# Patient Record
Sex: MALE | Race: WHITE
[De-identification: names, ages, dates, MRNs, and addresses within clinical notes are randomized per-mention and may not be internally consistent; named-entity substitution may affect disease eponyms.]

---

## 2018-04-27 ENCOUNTER — HOSPITAL ENCOUNTER (EMERGENCY)
Dept: HOSPITAL 97 - ER | Age: 33
Discharge: ANOTHER HOSPITAL | End: 2018-04-27
Payer: COMMERCIAL

## 2018-04-27 DIAGNOSIS — R55: Primary | ICD-10-CM

## 2018-04-27 DIAGNOSIS — R42: ICD-10-CM

## 2018-04-27 LAB
ALBUMIN SERPL BCP-MCNC: 4.5 G/DL (ref 3.2–5.5)
ALP SERPL-CCNC: 81 IU/L (ref 42–121)
ALT SERPL W P-5'-P-CCNC: 69 IU/L (ref 10–60)
AST SERPL W P-5'-P-CCNC: 59 IU/L (ref 10–42)
BUN BLD-MCNC: 11 MG/DL (ref 6–20)
GLUCOSE SERPLBLD-MCNC: 89 MG/DL (ref 65–120)
HCT VFR BLD CALC: 44.3 % (ref 39.6–49)
INR BLD: 1.05
LYMPHOCYTES # SPEC AUTO: 2 K/UL (ref 0.7–4.9)
MAGNESIUM SERPL-MCNC: 2 MG/DL (ref 1.8–2.5)
MCH RBC QN AUTO: 29.3 PG (ref 27–35)
MCV RBC: 86.1 FL (ref 80–100)
PMV BLD: 8.8 FL (ref 7.6–11.3)
POTASSIUM SERPL-SCNC: 3.9 MEQ/L (ref 3.6–5)
RBC # BLD: 5.15 M/UL (ref 4.33–5.43)

## 2018-04-27 PROCEDURE — 85025 COMPLETE CBC W/AUTO DIFF WBC: CPT

## 2018-04-27 PROCEDURE — 36415 COLL VENOUS BLD VENIPUNCTURE: CPT

## 2018-04-27 PROCEDURE — 70553 MRI BRAIN STEM W/O & W/DYE: CPT

## 2018-04-27 PROCEDURE — 70450 CT HEAD/BRAIN W/O DYE: CPT

## 2018-04-27 PROCEDURE — 71045 X-RAY EXAM CHEST 1 VIEW: CPT

## 2018-04-27 PROCEDURE — 85610 PROTHROMBIN TIME: CPT

## 2018-04-27 PROCEDURE — 83735 ASSAY OF MAGNESIUM: CPT

## 2018-04-27 PROCEDURE — 81003 URINALYSIS AUTO W/O SCOPE: CPT

## 2018-04-27 PROCEDURE — 80076 HEPATIC FUNCTION PANEL: CPT

## 2018-04-27 PROCEDURE — 99285 EMERGENCY DEPT VISIT HI MDM: CPT

## 2018-04-27 PROCEDURE — 84484 ASSAY OF TROPONIN QUANT: CPT

## 2018-04-27 PROCEDURE — 80048 BASIC METABOLIC PNL TOTAL CA: CPT

## 2018-04-27 PROCEDURE — 93005 ELECTROCARDIOGRAM TRACING: CPT

## 2018-04-27 PROCEDURE — 83880 ASSAY OF NATRIURETIC PEPTIDE: CPT

## 2018-04-27 NOTE — RAD REPORT
EXAM DESCRIPTION:  MRI - Brain W/Wo Cont - 4/27/2018 1:54 pm

 

CLINICAL HISTORY:  Syncope, altered mental status, abnormal CT study

 

COMPARISON:  CT head April 27

 

TECHNIQUE:  Sagittal and axial T1-weighted images were obtained. Axial PD/heavily T2-weighted and T2-
FLAIR images were obtained along with axial DWI/ADC mapping sequences.  Coronal heavily T2 weighted s
equence obtained.  Axial and coronal post-contrast T1-weighted images were also obtained. A 20 ml Mul
tiHance contrast following utilized.

 

FINDINGS:  Approximately 6 centimeter area of hypointense T1 and hyperintense T2/IR signal is present
 filling most of the right temporal lobe. There is localized mass effect. No ventricular entrapment s
een. Signal is predominantly hypointense on diffusion-weighted imaging and predominantly hyperintense
 on the ADC mapping sequence. Postcontrast imaging demonstrates multiple clustered ring-enhancing les
ions within this broader area of signal abnormality. Lesions range from 3 mm to 17 mm in diameter.

 

Elsewhere in the brain parenchyma there is no other area of signal abnormality or enhancement. No arpan
ft across the midline. No other areas of signal abnormality identified. Signal voids are seen as a no
rmal finding in the major intracranial vessels.

 

No globe or orbital content abnormality. Mastoid air cells are clear. Significant mucosal thickening 
changes are present in the maxillary sinuses.

Mastoid air cells and paranasal sinuses are clear.

 

Findings discussed with the referring clinician 1405 hours.

 

IMPRESSION:  Multiple clustered ring-enhancing lesions 3-17 mm in size contained within a 6 centimete
r area of edema filling the right temporal lobe.

 

GBM or other primary brain malignancy is the most likely etiology. MS or infection etiologies are unl
ikely.

 

No other brain parenchymal abnormalities outside of the right temporal lobe.

 

Prominent paranasal sinus mucosal thickening. No air-fluid level.

## 2018-04-27 NOTE — EDPHYS
Physician Documentation                                                                           

 [facility]                                                                

Name: Kali Handy                                                                               

Age: 33 yrs                                                                                       

Sex: Male                                                                                         

: 1985                                                                                   

MRN: [id number]                                                                                   

Arrival Date: 2018                                                                          

Time: 11:05                                                                                       

Account#: [id number]                                                                            

Bed 19                                                                                            

Private MD: Jasen Cam H                                                                    

ED Physician Mani Liu                                                                       

HPI:                                                                                              

                                                                                             

13:48 This 33 yrs old  Male presents to ER via Wheelchair with complaints of Passed  jr8 

      Out Prior To Arrival.                                                                       

13:48 The patient has experienced syncope. Onset: The symptoms/episode began/occurred         jr8 

      acutely, today. Duration: This was a single episode. Context: the episode(s) was            

      witnessed, by family. Associated injury: Head/face: contusion. Associated signs and         

      symptoms: Pertinent negatives: agitation, blurred vision, chest pain, confusion,            

      diaphoresis, dizziness, headache, nausea, palpitations, shortness of breath, vertigo,       

      vomiting, weakness. Current symptoms: Currently, the patient is not experiencing any        

      symptoms, the patient feels back to baseline, no decreased level of consciousness, no       

      confusion, no dysphasia, no headache, no paralysis, no visual changes. The patient has      

      not experienced similar symptoms in the past. The patient has been recently seen by a       

      physician:. Patient stated for the past couple of weeks has noticed periodic pardeep vu        

      like sensations and odd dissociative memories. Had near syncopal episode in Dr. Villalba office today. Syncopal episode prior to that. Dr. Villalba referred patient to ED     

      for further work up and evaluation of syncope and other neurological symptoms .             

                                                                                                  

Historical:                                                                                       

- Allergies:                                                                                      

11:31 NKA;                                                                                    iw  

- Home Meds:                                                                                      

12:23 None [Active];                                                                          iw  

- PMHx:                                                                                           

12:23 None;                                                                                   iw  

- PSHx:                                                                                           

11:31 None;                                                                                   iw  

                                                                                                  

- Immunization history:: Adult Immunizations up to date.                                          

- Social history:: Smoking status: Patient/guardian denies using tobacco.                         

                                                                                                  

                                                                                                  

ROS:                                                                                              

13:48 Eyes: Negative for injury, pain, redness, and discharge, ENT: Negative for injury,      jr8 

      pain, and discharge, Neck: Negative for injury, pain, and swelling, Cardiovascular:         

      Negative for chest pain, palpitations, and edema, Respiratory: Negative for shortness       

      of breath, cough, wheezing, and pleuritic chest pain, Abdomen/GI: Negative for              

      abdominal pain, nausea, vomiting, diarrhea, and constipation, Back: Negative for injury     

      and pain, MS/Extremity: Negative for injury and deformity, Skin: Negative for injury,       

      rash, and discoloration.                                                                    

13:48 Neuro: Positive for dizziness, headache.                                                    

                                                                                                  

Exam:                                                                                             

13:48 Eyes:  Pupils equal round and reactive to light, extra-ocular motions intact.  Lids and jr8 

      lashes normal.  Conjunctiva and sclera are non-icteric and not injected.  Cornea within     

      normal limits.  Periorbital areas with no swelling, redness, or edema. ENT:  Nares          

      patent. No nasal discharge, no septal abnormalities noted.  Tympanic membranes are          

      normal and external auditory canals are clear.  Oropharynx with no redness, swelling,       

      or masses, exudates, or evidence of obstruction, uvula midline.  Mucous membranes           

      moist. Neck:  Trachea midline, no thyromegaly or masses palpated, and no cervical           

      lymphadenopathy.  Supple, full range of motion without nuchal rigidity, or vertebral        

      point tenderness.  No Meningismus. Cardiovascular:  Regular rate and rhythm with a          

      normal S1 and S2.  No gallops, murmurs, or rubs.  Normal PMI, no JVD.  No pulse             

      deficits. Respiratory:  Lungs have equal breath sounds bilaterally, clear to                

      auscultation and percussion.  No rales, rhonchi or wheezes noted.  No increased work of     

      breathing, no retractions or nasal flaring. Abdomen/GI:  Soft, non-tender, with normal      

      bowel sounds.  No distension or tympany.  No guarding or rebound.  No evidence of           

      tenderness throughout. Back:  No spinal tenderness.  No costovertebral tenderness.          

      Full range of motion. Skin:  Warm, dry with normal turgor.  Normal color with no            

      rashes, no lesions, and no evidence of cellulitis. MS/ Extremity:  Pulses equal, no         

      cyanosis.  Neurovascular intact.  Full, normal range of motion. Neuro:  Awake and           

      alert, GCS 15, oriented to person, place, time, and situation.  Cranial nerves II-XII       

      grossly intact.  Motor strength 5/5 in all extremities.  Sensory grossly intact.            

      Cerebellar exam normal.  Normal gait.                                                       

                                                                                                  

Vital Signs:                                                                                      

11:31  / 80; Pulse 78; Resp 16 S; Temp 97.5(TE); Pulse Ox 99% on R/A; Pain 0/10;        iw  

12:53  / 88 Supine; Pulse 75; Resp 20; Pulse Ox 99% on R/A; Pain 0/10;                  em  

12:53  / 86 Sitting; Pulse 79; Resp 18; Pulse Ox 98% on R/A;                            em  

12:53  / 82 Standing; Pulse 88; Resp 18;                                                em  

13:58  / 80; Pulse 81; Resp 17; Pulse Ox 98% on R/A;                                    em  

15:15  / 83; Pulse 83; Resp 18; Temp 98.1; Pulse Ox 100% on R/A; Pain 0/10;             em  

                                                                                                  

MDM:                                                                                              

11:33 Patient medically screened.                                                             [facility] 

15:09 Data reviewed: vital signs, nurses notes, lab test result(s), EKG, radiologic studies,  [facility] 

      CT scan, MRI, plain films. Data interpreted: Pulse oximetry: on room air is 98 %.           

      Interpretation: normal. Counseling: I had a detailed discussion with the patient and/or     

      guardian regarding: the historical points, exam findings, and any diagnostic results        

      supporting the discharge/admit diagnosis, lab results, radiology results, the need to       

      transfer to another facility, for higher level of care, [facility]        

      does not immediately have the required specialist. ED course: [facility] contacted       

      and will accept patient for neurosurgical evaluation .                                      

                                                                                                  

                                                                                             

12:06 Order name: Basic Metabolic Panel; Complete Time: 13:11                                                                                                                              

12:06 Order name: BNP; Complete Time: 13:                                                                                             

12:06 Order name: CBC with Diff; Complete Time: 13:34                                         8 

                                                                                             

12:06 Order name: LFT's; Complete Time: 13:11                                                                                                                                              

12:06 Order name: Magnesium; Complete Time: 13:11                                                                                                                                          

12:06 Order name: PT-INR; Complete Time: 13:11                                                                                                                                             

12:06 Order name: XRAY Chest (1 view); Complete Time: 13:11                                                                                                                                

12:06 Order name: EKG; Complete Time: 12:07                                                                                                                                                

12:06 Order name: Troponin (emerg Dept Use Only); Complete Time: 13:11                        8 

                                                                                             

12:06 Order name: CT Head Brain wo Cont; Complete Time: 12:47                                 jr8 

                                                                                             

12:47 Order name: Brain W/Wo Cont; Complete Time: 14:28                                       EDMS

                                                                                             

15:04 Order name: Urine Dipstick--Ancillary (enter results); Complete Time: 15:22             sg  

                                                                                             

12:06 Order name: Cardiac monitoring; Complete Time: 12:43                                    jr8 

                                                                                             

12:06 Order name: EKG - Nurse/Tech; Complete Time: 12:42                                      jr8 

                                                                                             

12:06 Order name: IV Saline Lock; Complete Time: 12:43                                        8 

                                                                                             

12:06 Order name: Labs collected and sent; Complete Time: 12:45                               8 

                                                                                             

12:06 Order name: O2 Per Protocol; Complete Time: 13:13                                       jr8 

                                                                                             

12:06 Order name: O2 Sat Monitoring; Complete Time: 12:44                                     8 

                                                                                             

12:06 Order name: Urine Dipstick-Ancillary (obtain specimen); Complete Time: 13:13            8 

                                                                                             

12:06 Order name: Orthostatics; Complete Time: 13:13                                          jr8 

                                                                                                  

Administered Medications:                                                                         

No medications were administered                                                                  

                                                                                                  

                                                                                                  

Disposition:                                                                                      

17:18 Co-signature as Attending Physician, Mani Liu MD I agree with the assessment and   kdr 

      plan of care.                                                                               

                                                                                                  

Disposition:                                                                                      

18 15:11 Transfer ordered to [facility]. Diagnosis is Primary           

  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                                                      

- Reason for transfer: Higher level of care.                                                      

- Accepting physician is Dr. Contreras .                                                             

- Condition is Stable.                                                                            

- Problem is new.                                                                                 

- Symptoms are unchanged.                                                                         

                                                                                                  

                                                                                                  

                                                                                                  

Signatures:                                                                                       

Dispatcher [employer]                           EDMS                                                 

Mani Liu MD MD   kdr                                                  

Mitch Holguin, LVN                       LVN  em                                                   

Karla Sotelo, LIANE                     RN   Mohan White PA                        PA   jr8                                                  

                                                                                                  

Corrections: (The following items were deleted from the chart)                                    

12:47 12:28 Brain With Cont ordered. EDMS                                                     EDMS

12:47 12:37 Brain With Cont+MRI.RAD.BRZ ordered. EDMS                                         EDMS

                                                                                                  

**************************************************************************************************

## 2018-04-27 NOTE — EKG
Test Date:    2018-04-27               Test Time:    12:27:31

Technician:   SUDHAKAR                                    

                                                     

MEASUREMENT RESULTS:                                       

Intervals:                                           

Rate:         69                                     

GA:           184                                    

QRSD:         90                                     

QT:           368                                    

QTc:          394                                    

Axis:                                                

P:            68                                     

GA:           184                                    

QRS:          83                                     

T:            77                                     

                                                     

INTERPRETIVE STATEMENTS:                                       

                                                     

Normal sinus rhythm

Normal ECG

No previous ECG available for comparison



Electronically Signed On 04-27-18 16:23:28 CDT by Marty Michel

## 2018-04-27 NOTE — RAD REPORT
EXAM DESCRIPTION:  CT - Head Brain Wo Cont - 4/27/2018 12:16 pm

 

CLINICAL HISTORY:  Syncope with dizziness and nausea

 

COMPARISON:  None.

 

TECHNIQUE:  Computed axial tomography of the head was obtained. IV contrast was not requested.

 

All CT scans are performed using dose optimization technique as appropriate and may include automated
 exposure control or mA/KV adjustment according to patient size.

 

FINDINGS:  An intracranial  bleed is not seen .

 

The ventricles are normal in caliber.

 

No extra-axial fluid collection is noted.

 

A 5 centimeter low-density area is present within the right temporal lobe. There is suggestion of a 1
5 millimeter lesion within the right temporal lobe.

 

Fluid within the sinuses/ mastoids is not seen.

 

IMPRESSION:  A 5 centimeter low-density area within the right temporal lobe may represent a combinati
on of mass and surrounding edema. Less likely this represents an acute infarction. It is recommended 
that the patient have an MRI with contrast for further evaluation.

 

The examination was discussed with Mohan Guzman in the Emergency Room at 12:20 p.m. April 27th 2

## 2018-04-27 NOTE — ER
Nurse's Notes                                                                                     

 [facility]                                                                

Name: Kali Handy                                                                               

Age: 33 yrs                                                                                       

Sex: Male                                                                                         

: 1985                                                                                   

MRN: [id number]                                                                                   

Arrival Date: 2018                                                                          

Time: 11:05                                                                                       

Account#: [id number]                                                                            

Bed 19                                                                                            

Private MD: Jasen Cam H                                                                    

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                                      

                                                                                                  

Presentation:                                                                                     

                                                                                             

11:22 Presenting complaint: Patient states: had a syncopal episode at work this morning, pt   iw  

      states he felt a wave of dizziness and nausea, was able to sit himself on a table but       

      then fell forward, hitting face on floor, was out for a few seconds, was seen by EMS,       

      VSS, BS=89, went to be seen at Dr. Phillips's office and had a near syncopal episode         

      while in office, pt now feeling tired and weak but dizziness is now gone, denies CP or      

      palpitations or SOB. Transition of care: patient was not received from another setting      

      of care. Onset of symptoms was 2018. Initial Sepsis Screen: Does the patient      

      meet any 2 criteria? No. Patient's initial sepsis screen is negative. Does the patient      

      have a suspected source of infection? No. Patient's initial sepsis screen is negative.      

      Care prior to arrival: None.                                                                

11:22 Method Of Arrival: Wheelchair                                                           iw  

11:22 Acuity: DEDRA 3                                                                           iw  

                                                                                                  

Historical:                                                                                       

- Allergies:                                                                                      

11:31 NKA;                                                                                    iw  

- Home Meds:                                                                                      

12:23 None [Active];                                                                          iw  

- PMHx:                                                                                           

12:23 None;                                                                                   iw  

- PSHx:                                                                                           

11:31 None;                                                                                   iw  

                                                                                                  

- Immunization history:: Adult Immunizations up to date.                                          

- Social history:: Smoking status: Patient/guardian denies using tobacco.                         

                                                                                                  

                                                                                                  

Screenin:00 Abuse screen: Denies threats or abuse. Nutritional screening: No deficits noted.        em  

      Tuberculosis screening: No symptoms or risk factors identified. Fall Risk None              

      identified.                                                                                 

                                                                                                  

Assessment:                                                                                       

11:30 General: Appears in no apparent distress. comfortable, Behavior is calm, cooperative.   em  

      General: Reports falling and hitting face this morning, reports having aura type            

      symptoms with "strong imagery and smells" before having a syncopal episode. Pain:           

      Denies pain. Neuro: Level of Consciousness is awake, alert, obeys commands, Oriented to     

      person, place, time, situation,  are equal bilaterally Gait is steady, Speech is       

      normal, Reports dizziness, upon standing a syncopal episode Denies. Cardiovascular:         

      Capillary refill < 3 seconds Patient's skin is warm and dry. Respiratory: Airway is         

      patent Respiratory effort is even, unlabored, Respiratory pattern is regular,               

      symmetrical, Breath sounds are clear bilaterally. GI: Abdomen is flat, Reports nausea,      

      vomiting. : Urine is clear. EENT: No signs and/or symptoms were reported regarding        

      the EENT system. Derm: Skin is intact, Skin is pink, warm \T\ dry. Wound noted nose Wound   

      is cover with band aid, no apparent bleeding noted. Musculoskeletal: Range of motion:       

      intact in all extremities.                                                                  

11:40 Reassessment: Patient appears in no apparent distress at this time. I agree with above  iw  

      assessment by Mitch Holguin LVN.                                                             

12:40 Reassessment: Patient appears in no apparent distress at this time. Patient and/or      em  

      family updated on plan of care and expected duration. Pain level reassessed. Patient is     

      alert, oriented x 3, equal unlabored respirations, skin warm/dry/pink. Patient states       

      symptoms have improved.                                                                     

13:00 Reassessment: Patient appears in no apparent distress at this time. pt wheeled to [facility]   em  

      via wheelchair by [employer] tech.                                                                 

14:08 Reassessment: Patient appears in no apparent distress at this time. Patient and/or      em  

      family updated on plan of care and expected duration. Pain level reassessed. returned       

      from MRI, placed back on the monitor.                                                       

14:27 Reassessment: Patient appears in no apparent distress at this time. JD Preston at bedside em  

      to discuss POC.                                                                             

15:02 Reassessment: Patient appears in no apparent distress at this time. Patient and/or      em  

      family updated on plan of care and expected duration. Pain level reassessed. Patient is     

      alert, oriented x 3, equal unlabored respirations, skin warm/dry/pink. Avinash Harrington at bedside.                                                                         

15:38 Reassessment: Patient appears in no apparent distress at this time. report called to    vicky Bass RN at [facility], awaiting EMS transport to facility.                       

15:50 Reassessment: Patient appears in no apparent distress at this time. report given to   em  

      EMS.                                                                                        

                                                                                                  

Vital Signs:                                                                                      

11:31  / 80; Pulse 78; Resp 16 S; Temp 97.5(TE); Pulse Ox 99% on R/A; Pain 0/10;        iw  

12:53  / 88 Supine; Pulse 75; Resp 20; Pulse Ox 99% on R/A; Pain 0/10;                  em  

12:53  / 86 Sitting; Pulse 79; Resp 18; Pulse Ox 98% on R/A;                            em  

12:53  / 82 Standing; Pulse 88; Resp 18;                                                em  

13:58  / 80; Pulse 81; Resp 17; Pulse Ox 98% on R/A;                                    em  

15:15  / 83; Pulse 83; Resp 18; Temp 98.1; Pulse Ox 100% on R/A; Pain 0/10;             em  

                                                                                                  

ED Course:                                                                                        

11:05 Patient arrived in ED.                                                                  rg4 

11:05 Jasen Cam DO is Private Physician.                                               rg4 

11:22 Saul Lopez PA is PHCP.                                                                cp  

11:22 Mani Liu MD is Attending Physician.                                              cp  

11:31 Triage completed.                                                                       iw  

11:31 Antipyretic given from triage as ordered by the ER provider.                            iw  

11:33 Mohan Guzman PA is PHCP.                                                               jr8 

11:33 Mani Liu MD is Attending Physician.                                              jr8 

12:08 Patient moved to CT.                                                                    jg1 

12:14 Mitch Holguin LVN is Primary Nurse.                                                     em  

12:16 CT completed. Patient tolerated procedure well. Patient moved back from CT.             vm2 

12:17 CT Head Brain wo Cont In Process Unspecified.                                           EDMS

12:34 EKG done, by EKG tech. reviewed by Mohan MILES.                                      vh  

12:35 X-ray completed. Portable x-ray completed in exam room. Patient tolerated procedure     ml  

      well.                                                                                       

12:37 XRAY Chest (1 view) In Process Unspecified.                                             EDMS

12:45 Inserted saline lock: 20 gauge in right antecubital area, using aseptic technique.      em  

      Blood collected.                                                                            

12:45 Initial lab(s) drawn, by me, sent to lab.                                               em  

13:02 Patient moved to MRI via wheelchair.                                                    em2 

13:12 Arm band placed on.                                                                     em  

13:28 Brain W/Wo Cont In Process Unspecified.                                                 EDMS

13:54 MRI completed. Patient tolerated well. Patient moved back from MRI.                     ka  

14:06 No provider procedures requiring assistance completed.                                  em  

14:18 Patient has correct armband on for positive identification. Bed in low position. Call   em  

      light in reach.                                                                             

14:58 initiated transfer with [facility] at 1434 with Mehnaz Ba Transfer coordination.      eb  

15:00 \T\1436 Thomas MILES speaking with Dr. Khanna for transfer.                                  eb  

15:01 \T\1453 administrative approval given by Mehnaz Ba from [facility]   eb  

      pt to go to room 2222.                                                                      

16:13 Patient transferred, IV remains in place.                                               em  

                                                                                                  

Administered Medications:                                                                         

No medications were administered                                                                  

                                                                                                  

                                                                                                  

Outcome:                                                                                          

15:11 ER care complete, transfer ordered by MD. scott 

16:12 Transferred by ground EMS to [facility] [place], Transfer form completed.    em  

      X-rays sent w/ patient.                                                                     

16:12 Condition: good                                                                             

16:12 Instructed on the need for transfer, Demonstrated understanding of instructions.            

16:14 Patient left the ED.                                                                    em  

                                                                                                  

Signatures:                                                                                       

Dispatcher [employer]                           Delaney Reinoso                              jMitch Muñoz, VIBHAN                       LVN  em                                                   

Karla Sotelo, Desiree Campa RN, Josh, PA PA   jr8                                                  

Ayaz Shannon                              em2                                                  

Mila Mccartney Corey, PA PA cp Aguilera, Katelyn ka Garcia, Rubi                                 4                                                  

Bernie Hebert                            2                                                  

Desiree Nava                                                   

                                                                                                  

Corrections: (The following items were deleted from the chart)                                    

13:11 13:05 General: Appears in no apparent distress. comfortable, Behavior is calm,          em  

      cooperative, em                                                                             

13:11 13:05 Pain: Denies pain. em                                                             em  

13:11 13:05 General: Reports falling and hitting face this morning, reports having aura type  em  

      symptoms and having a syncopal episode em                                                   

13:11 13:05 Neuro: Level of Consciousness is awake, alert, obeys commands, Oriented to        em  

      person, place, time, situation,  are equal bilaterally Gait is steady, Speech is       

      normal, Reports dizziness, upon standing em                                                 

13:11 13:05 Cardiovascular: Capillary refill < 3 seconds Patient's skin is warm and dry. em   em  

13:11 13:05 Respiratory: Airway is patent Respiratory effort is even, unlabored, Respiratory  em  

      pattern is regular, symmetrical, Breath sounds are clear bilaterally. em                    

13:11 13:05 GI: Abdomen is flat, em                                                           em  

13:11 13: : Urine is clear, em                                                            em  

 13: EENT: No signs and/or symptoms were reported regarding the EENT system. em        em  

 13: Derm: Skin is intact, Skin is pink, warm \T\ dry. em                                em

 13: Musculoskeletal: Range of motion: intact in all extremities, em                   em  

14: 11:30 General: Reports falling and hitting face this morning, reports having aura type  em  

      symptoms and having a syncopal episode em                                                   

14 11:30 Derm: Skin is intact, Skin is pink, warm \T\ dry. em                                em

15: 11:31  / 80; Pulse 78bpm; Resp 16bpm; Spontaneous; Pulse Ox 99% RA; Pain 0/10; iw iw  

                                                                                                  

**************************************************************************************************

## 2018-04-27 NOTE — RAD REPORT
EXAM DESCRIPTION:  Thomas Single View4/27/2018 12:42 pm

 

CLINICAL HISTORY:  Chest pain

 

COMPARISON:  none

 

FINDINGS:   The lungs appear clear of acute infiltrate. The heart is normal size

 

IMPRESSION:   No acute abnormalities displayed

## 2019-07-11 ENCOUNTER — HOSPITAL ENCOUNTER (EMERGENCY)
Dept: HOSPITAL 97 - ER | Age: 34
Discharge: LEFT BEFORE DISCHARGE | End: 2019-07-11
Payer: COMMERCIAL

## 2019-07-11 DIAGNOSIS — Z53.21: Primary | ICD-10-CM

## 2019-07-11 NOTE — XMS REPORT
Patient Summary Document

 Created on:2019



Patient:FELIX ELDER

Sex:Male

:1985

External Reference #:[id number]





Demographics







 Address  [address] [place] [address]

 

 Home Phone  [phone]

 

 Email Address  NONE

 

 Preferred Language  Unknown

 

 Marital Status  Unknown

 

 Rastafarian Affiliation  Unknown

 

 Race  Unknown

 

 Additional Race(s)  Unavailable

 

 Ethnic Group  Unknown









Author







 [facility]

 

 Address  [address] [place] [address] Dr. Flores [address] [place] [address]

 

 Phone  [phone]









Care Team Providers







 Name  Role  Phone

 

 DERICK PETESIDNEY  Unavailable  Unavailable









Problems

This patient has no known problems.



Allergies, Adverse Reactions, Alerts

This patient has no known allergies or adverse reactions.



Medications

This patient has no known medications.



Results







 Test Description  Test Time  Test Comments  Text Results  Atomic Results  
Result Comments









 MR, BRAIN, WITH  2019 17:50:00    FINAL REPORT PATIENT ID:  



       [id number] MR, BRAIN, WITH \T\  



       WITHOUT CONTRAST INDICATION:  



       Neoplasm: head, CNS, rx monitor  



       or f/u TECHNIQUE: Multiplanar,  



       multisequence MR imaging of the  



       brain was obtained before and  



       after uneventful administration  



       of gadolinium contrast.  



       COMPARISON: 19  



       FINDINGS:The area of increased  



       linear enhancement noted in the  



       most recent examination has  



       increased in size, now  



       measuring approximately 11 mm  



       in greatest transverse  



       dimension. Additionally, there  



       is nodular enhancement along  



       the medial/inferior portion of  



       the resection cavity (axial  



       image 10, annotated sagittal  



       image 22). Increased T2/FLAIR  



       signal is also present adjacent  



       to this lesion, greater in  



       volume than in the prior exam.  



       A peripheral focus of  



       enhancement abutting the skull  



       base measures 11 mm in greatest  



       transverse dimension. Multiple  



       additional subcentimeter  



       nodular foci are annotated on  



       sagittal series (images 19, 20,  



       21, and 23). No abnormal signal  



       characteristics or postcontrast  



       enhancement is identified in  



       the left hemisphere or within  



       the posterior fossa. The  



       resection cavity volume is  



       unchanged. Orbital contents are  



       unremarkable. Paranasal sinuses  



       are clear.  IMPRESSION:  



       Progression of intracranial  



       metastatic disease as evidenced  



       by increasing volume,  



       enhancement and surrounding  



       T2/FLAIR hyperintensity in the  



       previously noted peripheral  



       lesion in the resection cavity  



       as well as multiple distal  



       lesions in the right temporal  



       lobe. Signed: JR Sanchez Robert MDReport Verified  



       Date/Time:  2019 17:50:06  



       Reading Location: [facility]  



       Radiology Reading Room  



       Electronically signed by:  



       CATRACHITO SANCHEZ on  



       2019 05:50 PM  

 

 MR, BRAIN, WITH  2019 14:34:00    FINAL REPORT PATIENT ID:  



       [id number] MRI Brain with and  



       without contrast Clinical  



       History: Neoplasm: head, CNS,  



       recurrence, suspected/known  



       Technique: MRI of the brain  



       utilizing axial T1, T2, FLAIR,  



       GRE, DWI, sagittal T1; and  



       postgadolinium axial, sagittal,  



       and coronal T1-weighted images.  



       Comparisons: MRI 2019,  



       10/8/2018, 2018, 2018  



       Findings: Right sided  



       craniotomy changes are again  



       seen with an anterior temporal  



       cystic resection cavity. There  



       is a new 4 mm linear focus of  



       enhancement along the posterior  



       superior surgical margin  



       abutting the temporal lobe.  



       Adjacent marginal nonenhancing  



       FLAIR signal abnormality also  



       appear slightly increased.  



       Right insular FLAIR signal  



       abnormality is unchanged  



       without additional areas of  



       abnormal enhancement.  There is  



       no evidence of acute infarct or  



       hemorrhage. There is no  



       hydrocephalus or midline shift.  



       The craniocervical junction is  



       preserved. The major  



       intracranial flow-voids appear  



       patent.  IMPRESSION: Since  



       2019, new focal linear  



       enhancement along the posterior  



       superior temporal resection  



       margin with slightly increased  



       FLAIR signal abnormality. The  



       appearance is compatible with  



       progression of residual  



       neoplasm. Signed: Sree Daniel  



       MDReport Verified Date/Time:  



       2019 14:34:38 Reading  



       Location: [facility]  



       Reading Room  



       Electronically signed by:  



       SREE DANIEL M.D. on  



       2019 02:34 PM  

 

 MR, BRAIN, WITH  2019 10:22:00    FINAL REPORT PATIENT ID:  



       [id number] MR, BRAIN, WITH \T\  



       WITHOUT CONTRAST INDICATION:  



       Neoplasm: head, CNS, rx monitor  



       or f/u TECHNIQUE: Multiplanar,  



       multisequence MR imaging of the  



       brain was obtained before and  



       after uneventful administration  



       of gadolinium contrast.  



       COMPARISON: 2018  



       FINDINGS:Stable cystic changes  



       within the right middle cranial  



       fossa resection cavity.  



       Previously noted increased T2  



       signal along the posterior  



       margin of the resection cavity  



       and involving the right  



       external capsule are stable.  



       There is no associated  



       postcontrast enhancement.  



       Evaluation of the remaining  



       brain parenchyma in both  



       supratentorial and  



       infratentorial compartments  



       reveals no abnormal signal or  



       postcontrast enhancement.  



       Midline is normally positioned.  



       Vascular structures are  



       unremarkable. The  



       craniocervical junction is  



       normal. Ventricular  



       configuration is within normal  



       limits. There is no acute  



       osseous abnormality. Paranasal  



       sinuses reveal mild mucosal  



       polyposis and thickening in the  



       maxillary sinuses, greater on  



       the left.  IMPRESSION:  Stable  



       operative changes in the middle  



       cranial fossa without evidence  



       for progression or recurrence.  



       Signed: JR Sanchez Robert MDReport Verified  



       Date/Time:  2019 10:22:37  



       Reading Location: [facility]  



       Radiology Reading Room  



       Electronically signed by:  



       CATRACHITO SANCHEZ on  



       2019 10:22 AM  

 

 MR, BRAIN, WITH  2018-10-08 14:14:00  Location->[facility]  FINAL REPORT PATIENT ID:
  



     Daniel Carias  [id number]  MRI brain with and  



       without contrast Comparison:  



          None Reason for exam: 2018 Discussion:  



       Multiplanar MR imaging of the  



       brain was provided pre-and-post  



       IV gadolinium administration  



       using T1, T2, FLAIR, T2 star,  



       diffusion weighted sequences,  



       and ADC map imaging.  



       Right-sided craniotomy changes  



       are noted with right-sided  



       anterior temporal lobectomy  



       cavity. Nonenhancing hazy T2  



       hyperintense signal alterations  



       are seen involving the insula  



       and the residual medial  



       temporal lobe unchanged. No  



       concerning regional  



       enhancement. There are no  



       intracranial hematomas, other  



       masses, hydrocephalus, shift,  



       or extra-axial collections.  



       There are no areas of abnormal  



       enhancement or abnormal  



       diffusion restriction.  



       Flow-voids are seen in the  



       basilar and internal carotid  



       arteries as well as in the  



       large posterior dural sinuses.  



       The pineal, sella, and  



       craniocervical junction regions  



       are unremarkable. The  



       visualized orbital contents,  



       skullbase and surrounding soft  



       tissues are unremarkable.  



       Mucosal thickening is seen  



       within the maxillary sinuses.  



       Impressions:  Stable  



       postoperative appearance with  



       residual nonenhancing insula  



       and medial temporal signal  



       alterations. Signed: Benny Gambino Verified  



       Date/Time:  10/08/2018 14:14:37  



       Reading Location: [facility] B1 C013V  



       Neuro Reading Room  



       Electronically signed by: BENNY GAMBINO M.D. on 10/08/2018  



       02:14 PM  

 

 MR, BRAIN, WITH  2018 10:51:00  Location->[facility]  FINAL REPORT PATIENT ID:
  



     Daniel Carias  [id number]  MRI brain with and  



       without contrast Comparison:  



          May 23, 2018 Reason for  



       exam: GBM status post surgery  



       and chemoradiation Discussion:  



       Multiplanar MR imaging of the  



       brain was provided pre-and-post  



       IV gadolinium administration  



       using T1, T2, FLAIR, T2 star,  



       diffusion weighted sequences,  



       and ADC map imaging. There is a  



       right temporal lobe operative  



       cavity anteriorly with some  



       areas of hemosiderin stain  



       lining the posterior cavity  



       margin. There is no concerning  



       enhancement currently.  



       Ill-defined T2 hyperintense  



       signal alteration is seen  



       involving insula concerning for  



       nonenhancing subtle neoplasm.  



       Similar signal alteration is  



       seen in the residual right  



       medial temporal and hippocampal  



       region where additional  



       nonenhancing neoplasm could not  



       be excluded. No intracranial  



       hematoma, hydrocephalus,  



       midline shift, extra-axial  



       collection. There are no other  



       areas of abnormal enhancement  



       or abnormal diffusion  



       restriction.      Flow-voids  



       are seen in the basilar and  



       internal carotid arteries as  



       well as in the large posterior  



       dural sinuses. The pineal,  



       sella, and craniocervical  



       junction regions are  



       unremarkable. The visualized  



       orbital contents, paranasal  



       sinuses, skullbase and  



       surrounding soft tissues are  



       unremarkable.     Impressions:  



       Subtle nonenhancing signal  



       alterations near the right  



       temporal lobe operative cavity  



       as discussed concerning for  



       nonenhancing neoplasm. No  



       current concerning enhancement.  



       Signed: Benny Gambino  



       Verified Date/Time:  2018  



       10:51:55 Reading Location: [address] Neuro Reading Room  



        Electronically signed by: BENNY GAMBINO M.D. on 2018  



       10:51 AM  

 

 MR, BRAIN, WITH  2018 16:07:00    FINAL REPORT PATIENT ID:  



       [id number] MRI Brain with and  



       without contrast INDICATION:  



       Malignant neoplasm of brain.  



       TECHNIQUE: Multiplanar,  



       multisequence MR imaging of the  



       brain was performed, utilizing  



       the following imaging  



       sequences: Axial T1, T2, FLAIR,  



       DWI, GRE; sagittal T1;  



       postcontrast axial, sagittal,  



       and coronal T1. High-resolution  



       axial postcontrast T1-weighted  



       images were obtained per  



       [employer] protocol COMPARISON:  



       MRI brain 2018  



       FINDINGS:There is evolution of  



       right sided craniotomy changes  



       with anterior temporal lobe  



       operative cavity, partial  



       resorption of operative site  



       and extra axial hemorrhages,  



       and improvement in operative  



       cavity margin edema. New mild  



       operative cavity margin  



       enhancement suggests  



       granulation changes, though  



       minimal enhancing neoplasm  



       cannot be excluded. A new 5 mm  



       focus of nodular enhancement in  



       the right inferior basal  



       ganglia is suggestive of  



       neoplasm. Small diffusion  



       restricted foci on the  



       operative cavity margins in the  



       right mesial temporal lobe may  



       reflect residual ischemia  



       and/or hypercellular neoplasm.  



       There are nonenhancing signal  



       changes in the right temporal  



       lobe, hippocampus, insula, and  



       operculum are in keeping with  



       infiltrative neoplasm. Mass  



       effect is improved, with no  



       current midline shift. A 3-4 mm  



       extra axial collection is seen  



       beneath the craniotomy site.  



       Pneumocephalus has resolved.  



       There is no hydrocephalus or  



       acute infarct. The major [place]ski  



       of [place] flow voids are  



       maintained. The sella,  



       craniocervical junction, and  



       orbits are unremarkable. There  



       is chronic polypoid sinus  



       mucosal disease and moderate  



       volume right mastoid air cell  



       fluid. Scalp postoperative  



       changes and edema are improved.  



       IMPRESSION: Since 2018,  



       improved post operative changes  



       with operative cavity margin  



       granulation changes and new  



       small right inferior basal  



       ganglia enhancing focus  



       suggestive of neoplasm.  



       Nonenhancing signal changes in  



       keeping with infiltrative  



       neoplasm again noted. No  



       worrisome current mass effect.  



       Signed: Dariel Polanco  



       MDReport Verified Date/Time:  



       2018 16:07:37 Reading  



       Location: [address] Consult  



       Reading Room   Electronically  



       signed by: DARIEL POLANCO M.D. on 2018 04:07 PM  

 

 TISSUE EXAM  2018 11:54:00    Surgical Pathology Report  



                         Case:  



       [id number]  



                 Authorizing Provider:  



        Ben Andrea MD  



       Collected:           2018  



       1027            Ordering  



       Location:     [facility]  



       PERIOPERATIVE         Received:  



                  2018 1032  



         



       SERVICES  



         



                  Pathologist:  



         Jerome Sutton MD  



         



                    Specimens:   A) -  



       Tumor, right temporal tumor  



         



                                    B)  



       - Tumor, right temporal tumor  



         



                         Outside  



       molecular studies are performed  



       at [facility] for  



       assessment of MGMT gene  



       promoter methylation. It is NOT  



       DETECTED in this patient's  



       tumor.Please see attached  



       report.Addendum electronically  



       signed by Jerome Sutton MD  



       on 2018 at 11:54 AMA.  



       BRAIN TUMOR, RIGHT TEMPORAL  



       LOBE, CRANIOTOMY: -  



       GLIOBLASTOMA, IDH1 WILD-TYPE  



       (WHO GRADE IV)B. BRAIN TUMOR,  



       RIGHT TEMPORAL LOBE,  



       CRANIOTOMY: - GLIOBLASTOMA,  



       IDH1 WILD-TYPE (WHO GRADE IV)  



          Signing Pathologist Direct  



       Phone Line:  



       [phone]  



       signed by Jerome Sutton MD  



       on 5/3/2018 at  5:37  



       PMAdditional testing for MGMT  



       promoter methylation status  



       will be performed at an outside  



       laboratory ([facility]). An  



       addendum report will be issued  



       upon receipt of these  



       results.[id number]  



       [id number] of right temporal  



       lobeA. Right temporal tumor; B.  



       Mass of right temporal lobeA.  



       Received fresh for  



       intraoperative consultation  



       labeled with the patient's  



       information and "right temporal  



       tumor" is a 1 x 0.9 x 0.3 cm  



       aggregate of tan-white  



       hemorrhagic soft glistening  



       tissue. A squash prep is  



       performed. A portion of the  



       specimen is submitted for  



       frozen section diagnosis in  



       FSA1. The remainder of the  



       specimen is submitted in A1.  



       SH/plB. Received in formalin  



       labeled with the patient's  



       information, "right temporal  



       tumor" is a 6 x 5.6 x 2.5 cm  



       aggregate of tan-white soft  



       tissue with abundant necrosis  



       and hemorrhage. Sectioning  



       reveals a tan-white friable cut  



       surface within normal-appearing  



       brain parenchyma.  



       Representative sections of  



       tissue are submitted in  



       cassette B1 through B6.  



       SH/plRIGHT TEMPORAL LOBE TUMOR,  



       CRANIOTOMY:   - GLIOBLASTOMA  



       THESE FINDINGS ARE REPORTED TO  



       OR-11 BY DR. SUTTON ON MAY 1ST,  



       2018 AT 10:51 A.M.A-B. Sections  



       of tumor show normal brain  



       parenchyma infiltrated by  



       atypical large tumor cells with  



       high nuclear to cytoplasmic  



       ratios, nuclear hyperchromasia,  



       multinucleation, and irregular  



       borders. The chromatin is  



       vesicular with prominent  



       nucleoli. There is abundant  



       necrosis, with and without  



       pseudopalisading, and vascular  



       proliferation. The tumor cells  



       are positive for GFAP and p53.  



       The tumor cells are negative  



       for IDH-1. The following  



       special studies were performed  



       on this case and the  



       interpretation is incorporated  



       in the diagnostic report above:  



       Block A2- IDH1, GFAP, p53. The  



       immunohistochemistry test was  



       developed and its performance  



       characteristics determined by  



       [facility],  



       Pathology Laboratory. It has  



       not been cleared or approved by  



       the U.S. Food and Drug  



       Administration. The FDA has  



       determined that such clearance  



       or approval is not necessary.  



       The test is used for clinical  



       purposes. It should not be  



       regarded as investigational or  



       for research. This laboratory  



       is certified under the Clinical  



       Laboratory Improvement  



       Amendments of 1988 (CLIA-88) as  



       qualified to perform high  



       complexity clinical laboratory  



       testing.  









 TOXOPLASMA GONDII ANTIBODY, IGM  2018 15:04:00    









   Test Item  Value  Reference Range  Comments









 TOXOPLASMA IGM ANTIBODY (VESNA) (test code=742)  Negative    



POCT-GLUCOSE [id number] 09:02:00





 Test Item  Value  Reference Range  Comments

 

 POC-GLUCOSE METER (BEAKER)  87 mg/dL    TESTED AT [facility] [address]



 (test uyzy=6709)      [facility] [address]



BASIC METABOLIC [id number] 05:48:00





 Test Item  Value  Reference Range  Comments

 

 SODIUM (BEAKER) (test  136 meq/L  136-145  



 obum=385)      

 

 POTASSIUM (BEAKER) (test  4.7 meq/L  3.5-5.1  Specimen slightly



 code=379)      hemolyzed

 

 CHLORIDE (BEAKER) (test  108 meq/L    



 code=382)      

 

 CO2 (BEAKER) (test  17 meq/L  22-29  



 code=355)      

 

 BLOOD UREA NITROGEN  23 mg/dL  7-21  



 (BEAKER) (test code=354)      

 

 CREATININE (BEAKER) (test  0.66 mg/dL  0.57-1.25  Specimen slightly



 code=358)      hemolyzed

 

 GLUCOSE RANDOM (BEAKER)  114 mg/dL    



 (test code=652)      

 

 CALCIUM (BEAKER) (test  9.0 mg/dL  8.4-10.2  



 code=697)      

 

 EGFR (BEAKER) (test  137 mL/min/1.73 sq m    ESTIMATED GFR IS NOT AS



 code=1092)      ACCURATE AS CREATININE



       CLEARANCE IN PREDICTING



       GLOMERULAR FILTRATION



       RATE. ESTIMATED GFR IS



       NOT APPLICABLE FOR



       DIALYSIS PATIENTS.



CBC W/PLT COUNT &amp; AUTO [id number] 05:35:00





 Test Item  Value  Reference Range  Comments

 

 WHITE BLOOD CELL COUNT (BEAKER) (test code=775)  17.4 K/ L  3.5-10.5  

 

 RED BLOOD CELL COUNT (BEAKER) (test code=761)  4.01 M/ L  4.63-6.08  

 

 HEMOGLOBIN (BEAKER) (test code=410)  11.5 GM/DL  13.7-17.5  

 

 HEMATOCRIT (BEAKER) (test code=411)  34.8 %  40.1-51.0  

 

 MEAN CORPUSCULAR VOLUME (BEAKER) (test code=753)  86.8 fL  79.0-92.2  

 

 MEAN CORPUSCULAR HEMOGLOBIN (BEAKER) (test  28.7 pg  25.7-32.2  



 cjlz=241)      

 

 MEAN CORPUSCULAR HEMOGLOBIN CONC (BEAKER) (test  33.0 GM/DL  32.3-36.5  



 code=752)      

 

 RED CELL DISTRIBUTION WIDTH (BEAKER) (test  11.7 %  11.6-14.4  



 code=412)      

 

 PLATELET COUNT (BEAKER) (test code=756)  254 K/CU MM  150-450  

 

 MEAN PLATELET VOLUME (BEAKER) (test code=754)  10.2 fL  9.4-12.4  

 

 NUCLEATED RED BLOOD CELLS (BEAKER) (test  0 /100 WBC  0-0  



 code=413)      

 

 NEUTROPHILS RELATIVE PERCENT (BEAKER) (test  90 %    



 code=429)      

 

 LYMPHOCYTES RELATIVE PERCENT (BEAKER) (test  6 %    



 code=430)      

 

 MONOCYTES RELATIVE PERCENT (BEAKER) (test  3 %    



 code=431)      

 

 EOSINOPHILS RELATIVE PERCENT (BEAKER) (test  0 %    



 code=432)      

 

 BASOPHILS RELATIVE PERCENT (BEAKER) (test  0 %    



 code=437)      

 

 NEUTROPHILS ABSOLUTE COUNT (BEAKER) (test  15.65 K/ L  1.78-5.38  



 code=670)      

 

 LYMPHOCYTES ABSOLUTE COUNT (BEAKER) (test  1.06 K/ L  1.32-3.57  



 code=414)      

 

 MONOCYTES ABSOLUTE COUNT (BEAKER) (test  0.55 K/ L  0.30-0.82  



 code=415)      

 

 EOSINOPHILS ABSOLUTE COUNT (BEAKER) (test  0.01 K/ L  0.04-0.54  



 code=416)      

 

 BASOPHILS ABSOLUTE COUNT (BEAKER) (test  0.02 K/ L  0.01-0.08  



 code=417)      

 

 IMMATURE GRANULOCYTES-RELATIVE PERCENT (BEAKER)  1 %  0-1  



 (test code=2801)      



CBC W/PLT COUNT &amp; AUTO [id number] 14:23:00





 Test Item  Value  Reference Range  Comments

 

 WHITE BLOOD CELL COUNT (BEAKER) (test code=775)  22.9 K/ L  3.5-10.5  

 

 RED BLOOD CELL COUNT (BEAKER) (test code=761)  3.98 M/ L  4.63-6.08  

 

 HEMOGLOBIN (BEAKER) (test code=410)  11.5 GM/DL  13.7-17.5  

 

 HEMATOCRIT (BEAKER) (test code=411)  33.9 %  40.1-51.0  

 

 MEAN CORPUSCULAR VOLUME (BEAKER) (test code=753)  85.2 fL  79.0-92.2  

 

 MEAN CORPUSCULAR HEMOGLOBIN (BEAKER) (test  28.9 pg  25.7-32.2  



 byog=394)      

 

 MEAN CORPUSCULAR HEMOGLOBIN CONC (BEAKER) (test  33.9 GM/DL  32.3-36.5  



 code=752)      

 

 RED CELL DISTRIBUTION WIDTH (BEAKER) (test  11.6 %  11.6-14.4  



 code=412)      

 

 PLATELET COUNT (BEAKER) (test code=756)  304 K/CU MM  150-450  

 

 MEAN PLATELET VOLUME (BEAKER) (test code=754)  9.4 fL  9.4-12.4  

 

 NUCLEATED RED BLOOD CELLS (BEAKER) (test  0 /100 WBC  0-0  



 code=413)      



BASIC METABOLIC [id number] 10:58:00





 Test Item  Value  Reference Range  Comments

 

 SODIUM (BEAKER) (test  138 meq/L  136-145  



 tqpo=212)      

 

 POTASSIUM (BEAKER) (test  3.9 meq/L  3.5-5.1  



 code=379)      

 

 CHLORIDE (BEAKER) (test  106 meq/L    



 code=382)      

 

 CO2 (BEAKER) (test  23 meq/L  22-29  



 code=355)      

 

 BLOOD UREA NITROGEN  18 mg/dL  7-21  



 (BEAKER) (test code=354)      

 

 CREATININE (BEAKER) (test  0.68 mg/dL  0.57-1.25  



 code=358)      

 

 GLUCOSE RANDOM (BEAKER)  144 mg/dL    



 (test code=652)      

 

 CALCIUM (BEAKER) (test  9.1 mg/dL  8.4-10.2  



 code=697)      

 

 EGFR (BEAKER) (test  133 mL/min/1.73 sq m    ESTIMATED GFR IS NOT AS



 code=1092)      ACCURATE AS CREATININE



       CLEARANCE IN PREDICTING



       GLOMERULAR FILTRATION



       RATE. ESTIMATED GFR IS



       NOT APPLICABLE FOR



       DIALYSIS PATIENTS.



POCT-GLUCOSE [id number] 08:38:00





 Test Item  Value  Reference Range  Comments

 

 POC-GLUCOSE METER (BEAKER)  127 mg/dL    TESTED AT [address]



 (test nbgk=7727)      [facility] [facility] [address]



BLOOD [id number] 06:00:00





 Test Item  Value  Reference Range  Comments

 

 CULTURE (BEAKER) (test code=1095)  No growth in 5 days    



POCT-GLUCOSE [id number] 05:50:00





 Test Item  Value  Reference Range  Comments

 

 POC-GLUCOSE METER (BEAKER)  136 mg/dL    TESTED AT [address]



 (test yzll=7051)      [facility] [address]



POCT-GLUCOSE [id number] 00:09:00





 Test Item  Value  Reference Range  Comments

 

 POC-GLUCOSE METER (BEAKER)  161 mg/dL    TESTED AT [facility] [address]



 (test ajcm=2284)      [facility] [address]



POCT-GLUCOSE [id number] 17:05:00





 Test Item  Value  Reference Range  Comments

 

 POC-GLUCOSE METER (BEAKER)  160 mg/dL    TESTED AT [place] [id number] [facility]



 (test ggfh=5738)      [facility] [address]



MR, BRAIN, [id number] 15:32:00FINAL REPORT PATIENT ID:   [id number] MRI Brain 
with and without contrast INDICATION: CNS neoplasm, postop TECHNIQUE: 
Multiplanar, multisequence MR imaging of the brain was performed, utilizing the 
following imaging sequences: Axial T1, T2, FLAIR, DWI, GRE; sagittal T1; 
postcontrast axial, sagittal, andcoronal T1. COMPARISON: MRI brain 2018 
FINDINGS:Right sided craniotomy changes are noted with an anterior temporal 
lobe operative cavity with internal hemorrhage, including dependent blood. 
There is small volume hemorrhages as well as ischemic and vasogenic edema in 
the adjacent temporal lobe andhippocampus. Allowing for T1 hyperintense blood 
products, there is negligible residual enhancing neoplasm. Nonenhancing signal 
changes in the insula, subinsular and basal ganglia region, operculum, 
andresidual temporal lobe are in keeping with edema and infiltrative neoplasm. 
Right hemispheric dural enhancement is likely reactive and due to granulation 
changes. There is residual mass effect with 3 mm leftward midline shift but no 
hydrocephalus. There is scattered pneumocephalus. A minimal extra-axial 
collection is seen beneath the craniotomy site. There is an overlying subgaleal 
collection containing gas and fluid. There is multifocal polypoid sinus mucosal 
disease with minimal fluid secretions and a moderate right mastoid effusion. 
The sella, craniocervical junction, and orbits are unremarkable. IMPRESSION: 
Since 2018, status post craniotomy for mass resection with operative site 
hemorrhages and local ischemic edema. Allowing for blood, negligible if any 
residual enhancing neoplasm. Nonenhancing signal changes persist, in keeping 
with infiltrative tumor. Advise MRI follow up. Signed: Dariel Polancoeport Verified Date/Time:  2018 15:32:30 Reading Location: [facility] 
C013V Neuro Reading Room   Electronically signed by: DARIEL POLANCO M.D. 
on 2018 03:32 PMPOCT-GLUCOSE [id number] 11:36:00





 Test Item  Value  Reference Range  Comments

 

 POC-GLUCOSE METER (BEAKER)  157 mg/dL    TESTED AT [address]



 (test oiqr=2258)      [place] [address]



[id number] 11:12:00





 Test Item  Value  Reference Range  Comments

 

 POTASSIUM (BEAKER) (test code=379)  4.0 meq/L  3.5-5.1  



Check Serum Potassium level 2 hours after oral potassium replacement completed 
or 30 min after intravenous potassium replacement.[id number] 11:12:00





 Test Item  Value  Reference Range  Comments

 

 MAGNESIUM (BEAKER) (test code=627)  2.4 mg/dL  1.6-2.6  



Check Serum Potassium level 2 hours after oral potassium replacement completed 
or 30 min after intravenous potassium replacement.POCT-GLUCOSE [id number] 
07:40:00





 Test Item  Value  Reference Range  Comments

 

 POC-GLUCOSE METER (BEAKER)  135 mg/dL    TESTED AT [address]



 (test qhwk=2277)      [place] [address]



[id number] 04:59:00





 Test Item  Value  Reference Range  Comments

 

 PHOSPHORUS (BEAKER) (test code=604)  2.8 mg/dL  2.3-4.7  



[id number] 04:59:00





 Test Item  Value  Reference Range  Comments

 

 MAGNESIUM (BEAKER) (test code=627)  1.9 mg/dL  1.6-2.6  



BASIC METABOLIC [id number] 04:59:00





 Test Item  Value  Reference Range  Comments

 

 SODIUM (BEAKER) (test  140 meq/L  136-145  



 fhzw=939)      

 

 POTASSIUM (BEAKER) (test  3.8 meq/L  3.5-5.1  



 code=379)      

 

 CHLORIDE (BEAKER) (test  108 meq/L    



 code=382)      

 

 CO2 (BEAKER) (test  21 meq/L  22-29  



 code=355)      

 

 BLOOD UREA NITROGEN  11 mg/dL  7-21  



 (BEAKER) (test code=354)      

 

 CREATININE (BEAKER) (test  0.72 mg/dL  0.57-1.25  



 code=358)      

 

 GLUCOSE RANDOM (BEAKER)  158 mg/dL    



 (test code=652)      

 

 CALCIUM (BEAKER) (test  8.7 mg/dL  8.4-10.2  



 code=697)      

 

 EGFR (BEAKER) (test  124 mL/min/1.73 sq m    ESTIMATED GFR IS NOT AS



 code=1092)      ACCURATE AS CREATININE



       CLEARANCE IN PREDICTING



       GLOMERULAR FILTRATION



       RATE. ESTIMATED GFR IS



       NOT APPLICABLE FOR



       DIALYSIS PATIENTS.



CBC (HEMOGRAM ONLY)2018 04:42:00





 Test Item  Value  Reference Range  Comments

 

 WHITE BLOOD CELL COUNT (BEAKER) (test code=775)  18.9 K/ L  3.5-10.5  

 

 RED BLOOD CELL COUNT (BEAKER) (test code=761)  4.44 M/ L  4.63-6.08  

 

 HEMOGLOBIN (BEAKER) (test code=410)  12.8 GM/DL  13.7-17.5  

 

 HEMATOCRIT (BEAKER) (test code=411)  38.2 %  40.1-51.0  

 

 MEAN CORPUSCULAR VOLUME (BEAKER) (test code=753)  86.0 fL  79.0-92.2  

 

 MEAN CORPUSCULAR HEMOGLOBIN (BEAKER) (test  28.8 pg  25.7-32.2  



 saey=516)      

 

 MEAN CORPUSCULAR HEMOGLOBIN CONC (BEAKER) (test  33.5 GM/DL  32.3-36.5  



 code=752)      

 

 RED CELL DISTRIBUTION WIDTH (BEAKER) (test  11.4 %  11.6-14.4  



 code=412)      

 

 PLATELET COUNT (BEAKER) (test code=756)  302 K/CU MM  150-450  

 

 MEAN PLATELET VOLUME (BEAKER) (test code=754)  9.7 fL  9.4-12.4  

 

 NUCLEATED RED BLOOD CELLS (BEAKER) (test  0 /100 WBC  0-0  



 code=413)      



[id number] 19:00:00





 Test Item  Value  Reference Range  Comments

 

 MAGNESIUM (BEAKER) (test  2.7 mg/dL  1.6-2.6  Specimen markedly hemolyzed



 code=627)      



TOXOPLASMA GONDII ANTIBODY, [id number] 13:56:00





 Test Item  Value  Reference Range  Comments

 

 TOXOPLASMA GONDII IGG (BEAKER) (test ynwu=234)  Negative    



HEPATIC FUNCTION [id number] 06:05:00





 Test Item  Value  Reference Range  Comments

 

 TOTAL PROTEIN (BEAKER) (test code=770)  6.9 gm/dL  6.0-8.3  

 

 ALBUMIN (BEAKER) (test code=1145)  4.1 g/dL  3.5-5.0  

 

 BILIRUBIN TOTAL (BEAKER) (test code=377)  0.9 mg/dL  0.2-1.2  

 

 BILIRUBIN DIRECT (BEAKER) (test code=706)  0.3 mg/dL  0.1-0.5  

 

 ALKALINE PHOSPHATASE (BEAKER) (test code=346)  89 U/L    

 

 AST (SGOT) (BEAKER) (test code=353)  25 U/L  5-34  

 

 ALT (SGPT) (BEAKER) (test code=347)  49 U/L  6-55  



BASIC METABOLIC [id number] 06:05:00





 Test Item  Value  Reference Range  Comments

 

 SODIUM (BEAKER) (test  140 meq/L  136-145  



 qeaf=905)      

 

 POTASSIUM (BEAKER) (test  4.0 meq/L  3.5-5.1  



 code=379)      

 

 CHLORIDE (BEAKER) (test  106 meq/L    



 code=382)      

 

 CO2 (BEAKER) (test  25 meq/L  22-29  



 code=355)      

 

 BLOOD UREA NITROGEN  11 mg/dL  7-21  



 (BEAKER) (test code=354)      

 

 CREATININE (BEAKER) (test  0.79 mg/dL  0.57-1.25  



 code=358)      

 

 GLUCOSE RANDOM (BEAKER)  91 mg/dL    



 (test code=652)      

 

 CALCIUM (BEAKER) (test  9.3 mg/dL  8.4-10.2  



 code=697)      

 

 EGFR (BEAKER) (test  112 mL/min/1.73 sq m    ESTIMATED GFR IS NOT AS



 code=1092)      ACCURATE AS CREATININE



       CLEARANCE IN PREDICTING



       GLOMERULAR FILTRATION



       RATE. ESTIMATED GFR IS



       NOT APPLICABLE FOR



       DIALYSIS PATIENTS.



CT, CHEST, WITH [id number] 03:01:00FINAL REPORT PATIENT ID:   [id number] 
   EXAMINATION: CT examinations of the chest, abdomen and pelviswith oral and 
IV contrast. CLINICAL HISTORY: Multiple brain lesions. Evaluate for primary 
neoplasm, metastatic disease or associated infection. COMPARISON EXAM: Brain 
MRI 2018 TECHNIQUE: Followingthe administration of IV contrast, axial 
tomographic images were acquired through the chest, abdomenand pelvis. 
Postprocessing was performed and coronal and sagittal reformatted images were 
created and reviewed. The exam was performed according to our departmental dose 
optimization program which includes automated exposure control, adjustment of 
the mA and/or kV according to patient's size and/or use of iterative 
reconstructive technique. FINDINGS:  Chest:  The thoracic aorta is normal in 
caliber. No evidence of an aortic dissection. The central pulmonary arteries 
are unremarkable. Evaluation of the smaller peripheral pulmonary arteries is 
limited by the non-PE protocol technique.  The heart sizeis normal. No evidence 
of a pericardial effusion. The esophagus is decompressed. No evidence of 
pathologic axillary or mediastinal lymphadenopathy. The trachea and central 
airways are unremarkable. Curvilinear opacities are noted in the dependent 
portion of both lung bases compatible with atelectasis. No evidence of a 
discrete pulmonary nodule, pneumonia, pulmonary edema, pleural effusion, 
pneumothorax or pneumomediastinum. No evidence of an acute thoracic osseous 
abnormality or pathologic bone lesion. Abdomen and pelvis: The liver 
demonstrates homogeneous contrast enhancement. The gallbladder, bile ducts and 
pancreas are unremarkable. The spleen is mildly prominent measuring 13 cm. The 
right adrenal gland is unremarkable.  The left adrenal gland is associated with 
subtle nodularity measuring 1 cm. Although a component may reflect partial 
volume averaging artifact, a small indeterminate adrenalnodule should also be 
considered. No evidence of renal obstruction, nephrolithiasis or discrete renal 
mass. The ureters are decompressed. The bladder is unremarkable. The prostate 
gland is mildly prominent. The stomach is grossly unremarkable. The loops of 
small bowel are normal in caliber. There is mild wall thickening of the distal 
small bowel including the terminal ileum. A moderate to large volume of 
inspissated stool is noted throughout the colon. The colonic fecal burden 
limits evaluation for an underlying mucosal lesion. No evidence of high-grade 
mechanical bowel obstruction, pneumatosis or pneumoperitoneum. No definite 
evidence of pathologically enlarged lymph nodes. The abdominal aorta isnormal 
in caliber. Contrast also opacifies the portal venous system, mesenteric vessels
, renal vessels and the iliac arteries. Evaluation of the IVC and iliac veins 
is limited by timing of the contrastbolus. The testicles are incompletely 
visualized. The right testicle is associated with mild nodularsoft tissue 
fullness. No evidence of an acute osseous abnormality. A sclerotic 11 mm focus 
is noted in the left proximal femur. A small bone island is morphologically and 
statistically favored. Small sclerotic metastasis cannot be excluded. IMPRESSION
:  Left adrenal 1 cm indeterminate (benign versus malignant) nodule. Dedicated 
adrenal protocol MRI or noncontrast CT could be performed for more definitive 
tissue characterization. Moderate to large volume of inspissated stool 
throughout the colon suggesting dysmotility/constipation. The fecal burden 
limits evaluation for a colonic mucosal lesion.  Mild wall thickening of the 
distal small bowel including the terminal ileum. Incomplete distention versus 
an ileitis. The right testicle is incompletely visualized but associated with 
mild nodular soft tissue fullness. Recommend clinical correlation. Dedicated 
scrotal ultrasound could be performed for evaluation if warranted. Small 1 cm 
sclerotic focus involving the left proximal femur. Bone island favored. Small 
sclerotic metastasis cannot be excluded. Mildly prominent spleen (13 cm). Signed
: Lucas Navarro MDReport Verified Date/Time:  2018 03:01:30 Reading 
Location: [address] Reading Room      Electronically signed by: 
LUCAS NAVARRO M.D. on 2018 03:01 [facility] [id number] 03:01:00FINAL 
REPORT PATIENT ID:   [id number]    EXAMINATION: CT examinations of the chest, 
abdomen and pelviswith oral and IV contrast. CLINICAL HISTORY: Multiple brain 
lesions. Evaluate for primary neoplasm, metastatic disease or associated 
infection. COMPARISON EXAM: Brain MRI 2018 TECHNIQUE: Followingthe 
administration of IV contrast, axial tomographic images were acquired through 
the chest, abdomenand pelvis. Postprocessing was performed and coronal and 
sagittal reformatted images were created and reviewed. The exam was performed 
according to our departmental dose optimization program which includes 
automated exposure control, adjustment of the mA and/or kV according to patient'
s size and/or use of iterative reconstructive technique. FINDINGS:  Chest:  The 
thoracic aorta is normal in caliber. No evidence of an aortic dissection. The 
central pulmonary arteries are unremarkable. Evaluation of the smaller 
peripheral pulmonary arteries is limited by the non-PE protocol technique.  The 
heart sizeis normal. No evidence of a pericardial effusion. The esophagus is 
decompressed. No evidence of pathologic axillary or mediastinal 
lymphadenopathy. The trachea and central airways are unremarkable. Curvilinear 
opacities are noted in the dependent portion of both lung bases compatible with 
atelectasis. No evidence of a discrete pulmonary nodule, pneumonia, pulmonary 
edema, pleural effusion, pneumothorax or pneumomediastinum. No evidence of an 
acute thoracic osseous abnormality or pathologic bone lesion. Abdomen and pelvis
: The liver demonstrates homogeneous contrast enhancement. The gallbladder, 
bile ducts and pancreas are unremarkable. The spleen is mildly prominent 
measuring 13 cm. The right adrenal gland is unremarkable.  The left adrenal 
gland is associated with subtle nodularity measuring 1 cm. Although a component 
may reflect partial volume averaging artifact, a small indeterminate 
adrenalnodule should also be considered. No evidence of renal obstruction, 
nephrolithiasis or discrete renal mass. The ureters are decompressed. The 
bladder is unremarkable. The prostate gland is mildly prominent. The stomach is 
grossly unremarkable. The loops of small bowel are normal in caliber. There is 
mild wall thickening of the distal small bowel including the terminal ileum. A 
moderate to large volume of inspissated stool is noted throughout the colon. 
The colonic fecal burden limits evaluation for an underlying mucosal lesion. No 
evidence of high-grade mechanical bowel obstruction, pneumatosis or 
pneumoperitoneum. No definite evidence of pathologically enlarged lymph nodes. 
The abdominal aorta isnormal in caliber. Contrast also opacifies the portal 
venous system, mesenteric vessels, renal vessels and the iliac arteries. 
Evaluation of the IVC and iliac veins is limited by timing of the 
contrastbolus. The testicles are incompletely visualized. The right testicle is 
associated with mild nodularsoft tissue fullness. No evidence of an acute 
osseous abnormality. A sclerotic 11 mm focus is noted in the left proximal 
femur. A small bone island is morphologically and statistically favored. Small 
sclerotic metastasis cannot be excluded. IMPRESSION:  Left adrenal 1 cm 
indeterminate (benign versus malignant) nodule. Dedicated adrenal protocol MRI 
or noncontrast CT could be performed for more definitive tissue 
characterization. Moderate to large volume of inspissated stool throughout the 
colon suggesting dysmotility/constipation. The fecal burden limits evaluation 
for a colonic mucosal lesion.  Mild wall thickening of the distal small bowel 
including the terminal ileum. Incomplete distention versus an ileitis. The 
right testicle is incompletely visualized but associated with mild nodular soft 
tissue fullness. Recommend clinical correlation. Dedicated scrotal ultrasound 
could be performed for evaluation if warranted. Small 1 cm sclerotic focus 
involving the left proximal femur. Bone island favored. Small sclerotic 
metastasis cannot be excluded. Mildly prominent spleen (13 cm). Signed: Lucas Navarro MDReport Verified Date/Time:  2018 03:01:30 Reading Location: [address] Reading Room      Electronically signed by: LUCAS NAVARRO M.D. on 2018 03:01 AMMR, BRAIN, [id number] 10:44:00Stealth protocol 
for operative planningFINAL REPORT PATIENT ID:   [id number] MRI Brain with and 
without contrast Clinical History: brain massTechnique: MRI of the brain 
utilizing axial T1, T2, FLAIR, GRE, DWI, sagittal T1; and postgadoliniumaxial, 
sagittal, and coronal T1-weighted images. Stealth protocol treatment planning 
sequences are included. Comparisons: Outside MRI 2018 Findings: A cluster 
of irregularly enhancing nodules is again seen in the right anterior temporal 
lobe spanning approximately 5 cm in maximal dimension. Surrounding T2 FLAIR 
hyperintense white matter edema is unchanged. Mass effect is again seen with 
medial right uncal deviation effacing the right ambient cistern and contouring 
the midbrain. Mild narrowing of the right lateral ventricle is again seen 
without significant midline shift of the septum pellucidum. No other enhancing 
masses are seen. There is no evidence of acute infarct or hemorrhage. There is 
no hydrocephalus. There are no extra-axial fluid collections. The 
craniocervical junction is preserved. The major intracranial flow-voids appear 
patent.  IMPRESSION: Since 2018, the right anterior temporal lobe cluster 
of irregularly enhancing masses is grossly unchanged. The differential 
diagnosis includes neoplasm versus atypical infection. Signed: Sree Daniel 
MDReport Verified Date/Time:  2018 10:44:22 Reading Location: [address] Neuro Reading Room      Electronically signed by: SREE DANIEL M.D. on 
2018 10:44 AMCBC W/PLT COUNT &amp; AUTO [id number] 08:30:00





 Test Item  Value  Reference Range  Comments

 

 WHITE BLOOD CELL COUNT (BEAKER) (test code=775)  9.8 K/ L  3.5-10.5  

 

 RED BLOOD CELL COUNT (BEAKER) (test code=761)  4.84 M/ L  4.63-6.08  

 

 HEMOGLOBIN (BEAKER) (test code=410)  13.8 GM/DL  13.7-17.5  

 

 HEMATOCRIT (BEAKER) (test code=411)  41.8 %  40.1-51.0  

 

 MEAN CORPUSCULAR VOLUME (BEAKER) (test code=753)  86.4 fL  79.0-92.2  

 

 MEAN CORPUSCULAR HEMOGLOBIN (BEAKER) (test  28.5 pg  25.7-32.2  



 lszv=524)      

 

 MEAN CORPUSCULAR HEMOGLOBIN CONC (BEAKER) (test  33.0 GM/DL  32.3-36.5  



 code=752)      

 

 RED CELL DISTRIBUTION WIDTH (BEAKER) (test  11.8 %  11.6-14.4  



 code=412)      

 

 PLATELET COUNT (BEAKER) (test code=756)  294 K/CU MM  150-450  

 

 MEAN PLATELET VOLUME (BEAKER) (test code=754)  9.8 fL  9.4-12.4  

 

 NUCLEATED RED BLOOD CELLS (BEAKER) (test  0 /100 WBC  0-0  



 code=413)      

 

 NEUTROPHILS RELATIVE PERCENT (BEAKER) (test  64 %    



 code=429)      

 

 LYMPHOCYTES RELATIVE PERCENT (BEAKER) (test  27 %    



 code=430)      

 

 MONOCYTES RELATIVE PERCENT (BEAKER) (test  7 %    



 code=431)      

 

 EOSINOPHILS RELATIVE PERCENT (BEAKER) (test  2 %    



 code=432)      

 

 BASOPHILS RELATIVE PERCENT (BEAKER) (test  1 %    



 code=437)      

 

 NEUTROPHILS ABSOLUTE COUNT (BEAKER) (test  6.23 K/ L  1.78-5.38  



 code=670)      

 

 LYMPHOCYTES ABSOLUTE COUNT (BEAKER) (test  2.62 K/ L  1.32-3.57  



 code=414)      

 

 MONOCYTES ABSOLUTE COUNT (BEAKER) (test  0.70 K/ L  0.30-0.82  



 code=415)      

 

 EOSINOPHILS ABSOLUTE COUNT (BEAKER) (test  0.19 K/ L  0.04-0.54  



 code=416)      

 

 BASOPHILS ABSOLUTE COUNT (BEAKER) (test  0.05 K/ L  0.01-0.08  



 code=417)      

 

 IMMATURE GRANULOCYTES-RELATIVE PERCENT (BEAKER)  0 %  0-1  



 (test code=2801)      



SEDIMENTATION [id number] 23:07:00





 Test Item  Value  Reference Range  Comments

 

 SEDIMENTATION RATE, ERYTHROCYTE (BEAKER) (test  22 mm/HR  0-15  



 code=766)      



HIV-1 ANTIGEN WITH HIV-1/2 [id number] 22:45:00





 Test Item  Value  Reference Range  Comments

 

 HIV-1 ANTIGEN WITH HIV 1\T\2 ANTIBODY (2)  Nonreactive  Nonreactive  



 (BEAKER) (test code=2586)      



HEPATIC FUNCTION [id number] 22:27:00





 Test Item  Value  Reference Range  Comments

 

 TOTAL PROTEIN (BEAKER) (test code=770)  7.2 gm/dL  6.0-8.3  

 

 ALBUMIN (BEAKER) (test code=1145)  4.3 g/dL  3.5-5.0  

 

 BILIRUBIN TOTAL (BEAKER) (test code=377)  0.9 mg/dL  0.2-1.2  

 

 BILIRUBIN DIRECT (BEAKER) (test code=706)  0.3 mg/dL  0.1-0.5  

 

 ALKALINE PHOSPHATASE (BEAKER) (test code=346)  89 U/L    

 

 AST (SGOT) (BEAKER) (test code=353)  34 U/L  5-34  

 

 ALT (SGPT) (BEAKER) (test code=347)  57 U/L  6-55  



BASIC METABOLIC [id number] 22:27:00





 Test Item  Value  Reference Range  Comments

 

 SODIUM (BEAKER) (test  140 meq/L  136-145  



 ypwn=342)      

 

 POTASSIUM (BEAKER) (test  3.3 meq/L  3.5-5.1  



 code=379)      

 

 CHLORIDE (BEAKER) (test  108 meq/L    



 code=382)      

 

 CO2 (BEAKER) (test  22 meq/L  22-29  



 code=355)      

 

 BLOOD UREA NITROGEN  10 mg/dL  7-21  



 (BEAKER) (test code=354)      

 

 CREATININE (BEAKER) (test  0.75 mg/dL  0.57-1.25  



 code=358)      

 

 GLUCOSE RANDOM (BEAKER)  127 mg/dL    



 (test code=652)      

 

 CALCIUM (BEAKER) (test  9.5 mg/dL  8.4-10.2  



 code=697)      

 

 EGFR (BEAKER) (test  119 mL/min/1.73 sq m    ESTIMATED GFR IS NOT AS



 code=1092)      ACCURATE AS CREATININE



       CLEARANCE IN PREDICTING



       GLOMERULAR FILTRATION



       RATE. ESTIMATED GFR IS



       NOT APPLICABLE FOR



       DIALYSIS PATIENTS.



C-REACTIVE [id number] 22:26:00





 Test Item  Value  Reference Range  Comments

 

 C-REACTIVE PROTEIN (BEAKER) (test code=676)  0.89 mg/dL  0.00-0.50  



[id number] 22:19:00





 Test Item  Value  Reference Range  Comments

 

 PROTIME (BEAKER) (test code=759)  14.6 seconds  11.7-14.7  

 

 INR (BEAKER) (test code=370)  1.1  <=5.9  

 

 PARTIAL THROMBOPLASTIN TIME (BEAKER) (test  30.9 seconds  22.5-36.0  



 code=760)      



RECOMMENDED COUMADIN/WARFARIN INR THERAPY RANGESSTANDARD DOSE: 2.0 - 3.0   
Includes: PROPHYLAXIS forvenous thrombosis, systemic embolization; TREATMENT 
for venous thrombosis and/or pulmonary embolus.HIGH RISK: Target INR is 2.5-3.5 
for patients with mechanical heart valves.

## 2019-07-11 NOTE — XMS REPORT
Clinical Summary

 Created on:2019



Patient:Kali Handy

Sex:Male

:1985

External Reference #:[id number]





Demographics







 Address  [address] [place] [address]

 

 Home Phone  [phone]

 

 Email Address  [email or web address]

 

 Preferred Language  English

 

 Marital Status  Unknown

 

 Rastafari Affiliation  Unknown

 

 Race  White

 

 Ethnic Group  Not  or 









Author







 Organization  [facility]

 

 Address  [address] [place] [address]









Support







 Name  Relationship  Address  Phone

 

 Mai Handy  Unavailable  [address]  [phone]



     [place] [address]  









Care Team Providers







 Name  Role  Phone

 

 Micaela  Primary Care Provider  [phone]









Allergies







 Active Allergy  Reactions  Severity  Noted Date  Comments

 

 Grass Pollen- Grass Standard      2018  

 

 Mold      2018  







Medications







 Medication  Sig  Dispensed  Refills  Start Date  End Date  Status

 

 cetirizine (ZYRTEC)  Take 10 mg by    0      Active



 10 MG tablet  mouth daily.          

 

 fluticasone (FLONASE)  1 spray by Nasal    0      Active



 50 mcg/actuation  route 2 (two)          



 nasal spray  times daily.          

 

 montelukast  Take 10 mg by    0      Active



 (SINGULAIR) 10 mg  mouth nightly.          



 tablet            

 

 dexamethasone  Take 2 tablets  7 tablet  0  2018    Active



 (DECADRON) 1 MG  at lunch and          



 tablet  dinner time          



   today, then 1          



   tablet three          



   times a day          



   tomorrow only,          



   then stop.          

 

 levETIRAcetam  Take 1 tablet  60 tablet  2  2018  



 (KEPPRA) 1000 MG  (1,000 mg total)          



 tablet  by mouth 2 (two)          



   times daily.          







Active Problems







 Problem  Noted Date

 

 Cerebral edema  2018

 

 Post-operative pain  2018

 

 Partial seizure  2018

 

 Brain mass  2018

 

 Seasonal allergies  2018

 

 Syncopal episodes  2018







Encounters







 Date  Type  Specialty  Care Team  Description

 

 2019  Hospital Encounter  Radiology  Brando Wilcox  Glioblastoma (HCC)



       MD Candido  

 

 2019  Orders Only  Neurology  Brando Wilcox  Glioblastoma (HCC)



       MD Candido  (Primary Dx)

 

 2019  Hospital Encounter  Radiology  Brando Wilcox  Glioblastoma (HCC)



       MD Candido  

 

 2019  Orders Only  Neurology  Brando Wilcox  Glioblastoma (HCC)



       MD Candido  (Primary Dx)

 

 2019  Hospital Encounter  Radiology  Brenden Brando  Glioblastoma (HCC)



       MD Candido  

 

 2018  Orders Only  Neurology  Brando Wilcox  Glioblastoma (HCC)



       MD Candido  (Primary Dx)

 

 2018  Orders Only  Neurology  Brando Wilcox  Glioblastoma (HCC)



       MD Candido  (Primary Dx)

 

 10/08/2018  Hospital Encounter  Radiology  Ty Chaudhary  Glioblastoma (HCC)



       MD Maryjo  

 

 10/08/2018  Orders Only  Neurology  Brando Wilcox  Glioblastoma (HCC)



       MD Candido  (Primary Dx)

 

 10/05/2018  Orders Only  Radiation Oncology  Ty Chaudhary (HCC
)



       MD Maryjo  (Primary Dx)

 

 2018  Procedure visit  Radiation Oncology  Ty Chaudhary MD  

 

 2018  Hospital Encounter  Radiology  Ty Chaudhary  Glioblastoma (HCC)



       MD Maryjo  

 

 2018  Orders Only  Radiation Oncology  Ty Chaudhary  Glioblastoma (HCC
)



       MD Maryjo  (Primary Dx)



after 07/10/2018



Social History







 Tobacco Use  Types  Packs/Day  Years Used  Date

 

 Never Smoker        









 Smokeless Tobacco: Never Used      









 Alcohol Use  Drinks/Week  oz/Week  Comments

 

 No      









 Sex Assigned at Birth  Date Recorded

 

 Not on file  









 Job Start Date  Occupation  Industry

 

 Not on file  Not on file  Not on file









 Travel History  Travel Start  Travel End









 No recent travel history available.







Last Filed Vital Signs

Not on file



Plan of Treatment

Not on file



Implants







 Implanted  Type  Area    Device  Shelf  Model /



         Identifier  Expiration  Serial /



           Date  Lot

 

 [facility] Full Strlprep 10ml [id number] - [id number]  Cement/F  Right:  LIANG:
BIOSCI    2019  [id number] /



 Implanted: Qty: 1 on 2018 by Ben Andrea MD iller/Derrick  Head         
/



   hesive          [id number]

 

 Sealant Durasl Spine 5ml [id number] - [id number]  Cement/F  Right:  INTEGRA LIFESCI
    2019  [id number] /



 Implanted: Qty: 1 on 2018 by Ben Andrea MD iller/Derrick  Head         
/



   hesive          [id number]

 

 Plt W/Tab Un3 2h [id number] - [id number]  Fracture  Right:  CHANELLE:CRANIOMA      
[id number] /



 Implanted: Qty: 1 on 2018 by Ben Andrea MD  /Fixatio  Head  
XILLOFACIAL       /



   n          

 

 Cover Chris Hole Low Prof 14mm  [id number] - [id number]  Fracture  Right:  CHANELLE:
CHANELLE      [id number] /



 Implanted: Qty: 1 on 2018 by Ben Andrea MD  /Fixatio  Head  
LEIBINGER       /



   n          

 

 Plt Un3 16h Str [id number] - [id number]  Fracture  Right:  CHANELLE:CRANIOMA      
[id number] /



 Implanted: Qty: 1 on 2018 by Ben Andrea MD  /Fixatio  Head  
XILLOFACIAL       /



   n          

 

 Scr Un3 Axis Self Drl 1.5x4mm  [id number] - [id number]  Fracture  Right:  CHANELLE:
CRANIOMA      [id number] /



 Implanted: Qty: 14 on 2018 by Ben Andrea MD  /Fixatio  Head  
XILLOFACIAL       /



   n          







Procedures







 Procedure Name  Priority  Date/Time  Associated Diagnosis  Comments

 

 MR BRAIN WITHOUT &  Routine  2019  3:25  Glioblastoma (HCC)  Results for 
this



 WITH IV CONTRAST    PM CDT    procedure are in



         the results



         section.

 

 MR BRAIN WITHOUT &  Routine  2019 11:00  Glioblastoma (HCC)  Results for 
this



 WITH IV CONTRAST    AM CST    procedure are in



         the results



         section.

 

 MR BRAIN WITHOUT &  Routine  2019  9:31  Glioblastoma (HCC)  Results for 
this



 WITH IV CONTRAST    AM CST    procedure are in



         the results



         section.

 

 MR BRAIN WITHOUT &  Routine  10/08/2018 11:40  Glioblastoma (HCC)  Results for 
this



 WITH IV CONTRAST    AM CDT    procedure are in



         the results



         section.

 

 MR BRAIN WITHOUT &  Routine  2018  8:58  Glioblastoma (HCC)  Results for 
this



 WITH IV CONTRAST    AM CDT    procedure are in



         the results



         section.



after 07/10/2018



Results

MR brain without &amp; with IV contrast (2019  3:25 PM CDT)Only the most 
recent of5 resultswithin the time period is included.





 Specimen

 

 









 Narrative  Performed At

 

 FINAL REPORT



  [facility]



  



  



 PATIENT ID: [id number]



  



  



  



 MR, BRAIN, WITH \T\ WITHOUT CONTRAST



  



  



  



 INDICATION: Neoplasm: head, CNS, rx monitor or f/u



  



  



  



 TECHNIQUE: Multiplanar, multisequence MR imaging of the brain was 



  



 obtained before and after uneventful administration of gadolinium 



  



 contrast.



  



  



  



 COMPARISON: 19



  



  



  



 FINDINGS:



  



 The area of increased linear enhancement noted in the most recent 



  



 examination has increased in size, now measuring approximately 11 mm 



  



 in greatest transverse dimension. Additionally, there is nodular 



  



 enhancement along the medial/inferior portion of the resection cavity 



  



 (axial image 10, annotated sagittal image 22). Increased T2/FLAIR 



  



 signal is also present adjacent to this lesion, greater in volume 



  



 than in the prior exam.



  



  



  



 A peripheral focus of enhancement abutting the skull base measures 11 



  



 mm in greatest transverse dimension. Multiple additional 



  



 subcentimeter nodular foci are annotated on sagittal series (images 



  



 19, 20, 21, and 23). No abnormal signal characteristics or 



  



 postcontrast enhancement is identified in the left hemisphere or 



  



 within the posterior fossa. The resection cavity volume is unchanged. 



  



 Orbital contents are unremarkable. Paranasal sinuses are clear.



  



  



  



  



  



 IMPRESSION: 



  



  



  



 Progression of intracranial metastatic disease as evidenced by 



  



 increasing volume, enhancement and surrounding T2/FLAIR 



  



 hyperintensity in the previously noted peripheral lesion in the 



  



 resection cavity as well as multiple distal lesions in the right 



  



 temporal lobe.



  



  



  



 Signed: JR Sanchez Robert MD



  



 Report Verified Date/Time:2019 17:50:06



  



  



  



 Reading Location: [facility] Radiology Reading Room



  



 Electronically signed by: CATRACHITO SANCHEZ on 2019  



 05:50 PM



  



   









 Procedure Note

 

 Interface, External Ris In - 2019  5:52 PM CDT



FINAL REPORT



 



 PATIENT ID:   [id number]



 



 MR, BRAIN, WITH \T\ WITHOUT CONTRAST



 



 INDICATION: Neoplasm: head, CNS, rx monitor or f/u



 



 TECHNIQUE: Multiplanar, multisequence MR imaging of the brain was



 obtained before and after uneventful administration of gadolinium



 contrast.



 



 COMPARISON: 19



 



 FINDINGS:



 The area of increased linear enhancement noted in the most recent



 examination has increased in size, now measuring approximately 11 mm



 in greatest transverse dimension. Additionally, there is nodular



 enhancement along the medial/inferior portion of the resection cavity



 (axial image 10, annotated sagittal image 22). Increased T2/FLAIR



 signal is also present adjacent to this lesion, greater in volume



 than in the prior exam.



 



 A peripheral focus of enhancement abutting the skull base measures 11



 mm in greatest transverse dimension. Multiple additional



 subcentimeter nodular foci are annotated on sagittal series (images



 19, 20, 21, and 23). No abnormal signal characteristics or



 postcontrast enhancement is identified in the left hemisphere or



 within the posterior fossa. The resection cavity volume is unchanged.



 Orbital contents are unremarkable. Paranasal sinuses are clear.



 



 



 IMPRESSION:



 



 Progression of intracranial metastatic disease as evidenced by



 increasing volume, enhancement and surrounding T2/FLAIR



 hyperintensity in the previously noted peripheral lesion in the



 resection cavity as well as multiple distal lesions in the right



 temporal lobe.



 



 Signed: JR Daniel, Catrachito DEGROOT



 Report Verified Date/Time:  2019 17:50:06



 



 Reading Location: [facility]iology Reading Room



     Electronically signed by: CATRACHITO SANCHEZ on 2019 05:50 PM



 









 Performing Organization  Address  City/State/Zipcode  Phone Number

 

 [place]      



after 07/10/2018



Insurance







 Payer  Benefit Plan / Group  Subscriber ID  Type  Phone  Address

 

 [employer] - [employer]  [employer]  xxxxxxxxxx  HMO/POS    









 Guarantor Name  Account Type  Relation to  Date of  Phone  Billing



     Patient  Birth    Address

 

 Kali Handy  Personal/Family  Self  1985  [phone]  [address] [place] [address]        ([place])  [place] [address]







Advance Directives

For more information, please contact:[address] [phone]





 Code Status  Date Activated  Date Inactivated  Comments

 

 Full Code  2018  2:25 PM  2018  1:22 PM  









 This code status was determined by:  Patient  









       

 

 Full Code  2018  6:17 PM  2018  2:25 PM  









 This code status was determined by:  Patient

## 2019-07-11 NOTE — ER
Nurse's Notes                                                                                     

 [facility]                                                                 

Name: Kali Handy                                                                               

Age: 34 yrs                                                                                       

Sex: Male                                                                                         

: 1985                                                                                   

MRN: [id number]                                                                                   

Arrival Date: 2019                                                                          

Time: 18:33                                                                                       

Account#: [id number]                                                                            

Bed Waiting                                                                                       

Private MD:                                                                                       

Diagnosis:                                                                                        

                                                                                                  

ED Course:                                                                                        

                                                                                             

18:33 Patient arrived in ED.                                                                  as  

19:15 Patient's name was called from ER lobby. No response. Unable to locate patient. Will    ak1 

      disposition as left without being seen by a provider.                                       

                                                                                                  

Administered Medications:                                                                         

No medications were administered                                                                  

                                                                                                  

                                                                                                  

Outcome:                                                                                          

19:16 Patient left the ED.                                                                    ak1 

                                                                                                  

Signatures:                                                                                       

Claire Rooney Amber, RN                       RN   ak1                                                  

                                                                                                  

**************************************************************************************************